# Patient Record
Sex: MALE | Race: ASIAN | NOT HISPANIC OR LATINO | URBAN - NONMETROPOLITAN AREA
[De-identification: names, ages, dates, MRNs, and addresses within clinical notes are randomized per-mention and may not be internally consistent; named-entity substitution may affect disease eponyms.]

---

## 2024-02-05 ENCOUNTER — OV NP (OUTPATIENT)
Dept: URBAN - NONMETROPOLITAN AREA CLINIC 4 | Facility: CLINIC | Age: 59
End: 2024-02-05

## 2024-02-07 ENCOUNTER — OV NP (OUTPATIENT)
Dept: URBAN - NONMETROPOLITAN AREA CLINIC 4 | Facility: CLINIC | Age: 59
End: 2024-02-07
Payer: COMMERCIAL

## 2024-02-07 VITALS
HEIGHT: 68 IN | TEMPERATURE: 97.7 F | BODY MASS INDEX: 24.92 KG/M2 | SYSTOLIC BLOOD PRESSURE: 142 MMHG | HEART RATE: 88 BPM | WEIGHT: 164.4 LBS | DIASTOLIC BLOOD PRESSURE: 84 MMHG

## 2024-02-07 DIAGNOSIS — R14.2 BELCHING: ICD-10-CM

## 2024-02-07 DIAGNOSIS — R10.11 RUQ PAIN: ICD-10-CM

## 2024-02-07 PROCEDURE — 99204 OFFICE O/P NEW MOD 45 MIN: CPT | Performed by: REGISTERED NURSE

## 2024-02-07 RX ORDER — OMEPRAZOLE 40 MG/1
1 CAPSULE 30 MINUTES BEFORE MORNING MEAL CAPSULE, DELAYED RELEASE ORAL ONCE A DAY
Qty: 30 | Refills: 1 | OUTPATIENT
Start: 2024-02-07

## 2024-02-07 NOTE — HPI-TODAY'S VISIT:
2/7/24: Pt is a 57 yo male with PMH of HLD who was referred by Dr. Nikki Batista for evaluation of belching, hiccups and reflux.  A copy of this document will be sent to the referring physician.   Pt reports worsening belching since last year. Occurs with and without oral intake, but somewhat worse after eating. Reports associated intermittent bloating and reflux depending on what he eats. Denies chronic NSAID. Denies N/V, dysphagia, melena, hematochezia, weight loss, change in bowel habits, diarrhea, constipation. He developed a dull pain in RUQ a few weeks ago. Worse when he bends down. He started taking Weisen-U (Japanese supplement) a week ago and has noticed signifant improvement in pain, but he continues to have belching. No FHx of esophageal, stomach or CRC. Last cscope 2-3 years ago by Geetha that showed benign polyp(s).

## 2024-02-12 LAB — H PYLORI BREATH TEST: NOT DETECTED

## 2024-03-06 ENCOUNTER — OV EP (OUTPATIENT)
Dept: URBAN - NONMETROPOLITAN AREA CLINIC 4 | Facility: CLINIC | Age: 59
End: 2024-03-06

## 2025-03-11 ENCOUNTER — TELEPHONE ENCOUNTER (OUTPATIENT)
Dept: URBAN - NONMETROPOLITAN AREA CLINIC 4 | Facility: CLINIC | Age: 60
End: 2025-03-11